# Patient Record
Sex: MALE | Race: WHITE
[De-identification: names, ages, dates, MRNs, and addresses within clinical notes are randomized per-mention and may not be internally consistent; named-entity substitution may affect disease eponyms.]

---

## 2019-12-31 ENCOUNTER — HOSPITAL ENCOUNTER (INPATIENT)
Dept: HOSPITAL 26 - MED | Age: 34
LOS: 5 days | Discharge: HOME | DRG: 720 | End: 2020-01-05
Attending: GENERAL PRACTICE | Admitting: GENERAL PRACTICE
Payer: MEDICAID

## 2019-12-31 VITALS — HEIGHT: 72 IN | WEIGHT: 315 LBS | BODY MASS INDEX: 42.66 KG/M2

## 2019-12-31 VITALS — DIASTOLIC BLOOD PRESSURE: 75 MMHG | SYSTOLIC BLOOD PRESSURE: 140 MMHG

## 2019-12-31 VITALS — DIASTOLIC BLOOD PRESSURE: 85 MMHG | SYSTOLIC BLOOD PRESSURE: 145 MMHG

## 2019-12-31 VITALS — DIASTOLIC BLOOD PRESSURE: 80 MMHG | SYSTOLIC BLOOD PRESSURE: 153 MMHG

## 2019-12-31 DIAGNOSIS — Z71.3: ICD-10-CM

## 2019-12-31 DIAGNOSIS — E87.1: ICD-10-CM

## 2019-12-31 DIAGNOSIS — E78.5: ICD-10-CM

## 2019-12-31 DIAGNOSIS — L03.115: ICD-10-CM

## 2019-12-31 DIAGNOSIS — A41.9: Primary | ICD-10-CM

## 2019-12-31 DIAGNOSIS — Z96.659: ICD-10-CM

## 2019-12-31 DIAGNOSIS — E66.01: ICD-10-CM

## 2019-12-31 LAB
ALBUMIN FLD-MCNC: 3.4 G/DL (ref 3.4–5)
ANION GAP SERPL CALCULATED.3IONS-SCNC: 15.2 MMOL/L (ref 8–16)
APPEARANCE UR: CLEAR
AST SERPL-CCNC: 15 U/L (ref 15–37)
BARBITURATES UR QL SCN: (no result) NG/ML
BASOPHILS # BLD AUTO: 0 K/UL (ref 0–0.22)
BASOPHILS NFR BLD AUTO: 0.2 % (ref 0–2)
BENZODIAZ UR QL SCN: (no result) NG/ML
BILIRUB SERPL-MCNC: 0.7 MG/DL (ref 0–1)
BILIRUB UR QL STRIP: NEGATIVE
BUN SERPL-MCNC: 7 MG/DL (ref 7–18)
BZE UR QL SCN: (no result) NG/ML
CANNABINOIDS UR QL SCN: (no result) NG/ML
CHLORIDE SERPL-SCNC: 98 MMOL/L (ref 98–107)
CO2 SERPL-SCNC: 26.5 MMOL/L (ref 21–32)
COLOR UR: YELLOW
CREAT SERPL-MCNC: 0.9 MG/DL (ref 0.7–1.3)
EOSINOPHIL # BLD AUTO: 0 K/UL (ref 0–0.4)
EOSINOPHIL NFR BLD AUTO: 0 % (ref 0–4)
ERYTHROCYTE [DISTWIDTH] IN BLOOD BY AUTOMATED COUNT: 14.3 % (ref 11.6–13.7)
GFR SERPL CREATININE-BSD FRML MDRD: 123 ML/MIN (ref 90–?)
GLUCOSE SERPL-MCNC: 108 MG/DL (ref 74–106)
GLUCOSE UR STRIP-MCNC: NEGATIVE MG/DL
HCT VFR BLD AUTO: 44.9 % (ref 36–52)
HGB BLD-MCNC: 14.7 G/DL (ref 12–18)
HGB UR QL STRIP: NEGATIVE
LEUKOCYTE ESTERASE UR QL STRIP: NEGATIVE
LYMPHOCYTES # BLD AUTO: 1.3 K/UL (ref 2–11.5)
LYMPHOCYTES NFR BLD AUTO: 7.6 % (ref 20.5–51.1)
MAGNESIUM SERPL-MCNC: 1.9 MG/DL (ref 1.8–2.4)
MCH RBC QN AUTO: 27 PG (ref 27–31)
MCHC RBC AUTO-ENTMCNC: 33 G/DL (ref 33–37)
MCV RBC AUTO: 83.9 FL (ref 80–94)
MONOCYTES # BLD AUTO: 0.6 K/UL (ref 0.8–1)
MONOCYTES NFR BLD AUTO: 3.9 % (ref 1.7–9.3)
NEUTROPHILS # BLD AUTO: 14.5 K/UL (ref 1.8–7.7)
NEUTROPHILS NFR BLD AUTO: 88.3 % (ref 42.2–75.2)
NITRITE UR QL STRIP: NEGATIVE
OPIATES UR QL SCN: (no result) NG/ML
PCP UR QL SCN: (no result) NG/ML
PH UR STRIP: 7 [PH] (ref 5–9)
PHOSPHATE SERPL-MCNC: 2.7 MG/DL (ref 2.5–4.9)
PLATELET # BLD AUTO: 215 K/UL (ref 140–450)
POTASSIUM SERPL-SCNC: 3.7 MMOL/L (ref 3.5–5.1)
PROTHROMBIN TIME: 10.8 SECS (ref 10.8–13.4)
RBC # BLD AUTO: 5.35 MIL/UL (ref 4.2–6.1)
SODIUM SERPL-SCNC: 136 MMOL/L (ref 136–145)
TSH SERPL DL<=0.05 MIU/L-ACNC: 0.96 UIU/ML (ref 0.34–3.74)
WBC # BLD AUTO: 16.4 K/UL (ref 4.8–10.8)

## 2019-12-31 RX ADMIN — ACETAMINOPHEN PRN MG: 325 TABLET ORAL at 23:38

## 2019-12-31 RX ADMIN — SODIUM CHLORIDE SCH MLS/HR: 9 INJECTION, SOLUTION INTRAVENOUS at 17:08

## 2019-12-31 RX ADMIN — DEXTROSE SCH MLS/HR: 50 INJECTION, SOLUTION INTRAVENOUS at 23:22

## 2019-12-31 RX ADMIN — SODIUM CHLORIDE SCH MLS/HR: 9 INJECTION, SOLUTION INTRAVENOUS at 18:55

## 2019-12-31 RX ADMIN — SODIUM CHLORIDE SCH MLS/HR: 9 INJECTION, SOLUTION INTRAVENOUS at 15:22

## 2019-12-31 NOTE — NUR
RECEIVED PATIENT FROM ER STAFF, PATIENT IN STABLE CONDITION, VITAL SIGNS ARE STABLE, WILL 
CONTINUE FLUIDS AND ABX AND START ADMISSION PAPERWORK

## 2019-12-31 NOTE — NUR
VITAL SIGNS TAKEN AT THIS TIME. PATIENT NOTED WITH TEMP .2. COOLING MEASURES RENDERED 
AND PRN ACETAMINOPHEN ADMINISTERED.

## 2019-12-31 NOTE — NUR
RECEIVED BEDSIDE REPORT FROM AM SHIFT NURSE. PATIENT SITTING ON BED WITH FAMILY MEMBER AT 
BEDSIDE. NO SOB OR DISTRESS NOTED. IV ACCESS ON LEFT AC, 18 GAUGE, PATENT AND INTACT. 
PATIENT NOTED WITH REDNESS ON LOWER RIGHT LEG, WARM TO TOUCH. INITIAL ASSESSMENT DONE. BOARD 
UPDATED. BED IN LOW, SAFETY MEASURES IN PLACE.  PATIENT IS AMBULATORY. PATIENT ON TELE 
MONITORING. CALL LIGHT PLACED WITHIN PATIENT REACH. WILL CONTINUE TO MONITOR PATIENT.

## 2019-12-31 NOTE — NUR
AAO X4 34 Y O M BIB FAMILY W/ C/O RLE PAIN/SWELLING/ERETHYMA STARTED YESTERDAY 
AT 1400. DENIES INJURY OR MED HX. PER PT HE WAS AT HOME

## 2019-12-31 NOTE — NUR
Patient will be admitted to care of DR GUTIERREZ. Admited to TELEMETRY.  Will go to 
room 112A. Belongings list completed.  Report to GIOVANNA DOVE.

## 2019-12-31 NOTE — NUR
PATIENT IN BED RESTING TALKING TO A FRIEND AT THE BEDSIDE. DENIES PAIN, NO RESP DISTRESS 
NOTED. WILL CONTINUE TO MONITOR.

## 2019-12-31 NOTE — NUR
VISUAL CHECK DONE. PATIENT IS EATING DINNER THAT HIS FAMILY BROUGHT IN. NO SOB OR DISTRESS 
NOTED. WILL CONTINUE TO MONITOR PATIENT.

## 2020-01-01 VITALS — SYSTOLIC BLOOD PRESSURE: 129 MMHG | DIASTOLIC BLOOD PRESSURE: 73 MMHG

## 2020-01-01 VITALS — SYSTOLIC BLOOD PRESSURE: 137 MMHG | DIASTOLIC BLOOD PRESSURE: 77 MMHG

## 2020-01-01 VITALS — SYSTOLIC BLOOD PRESSURE: 132 MMHG | DIASTOLIC BLOOD PRESSURE: 78 MMHG

## 2020-01-01 VITALS — SYSTOLIC BLOOD PRESSURE: 136 MMHG | DIASTOLIC BLOOD PRESSURE: 79 MMHG

## 2020-01-01 VITALS — SYSTOLIC BLOOD PRESSURE: 109 MMHG | DIASTOLIC BLOOD PRESSURE: 79 MMHG

## 2020-01-01 LAB
ANION GAP SERPL CALCULATED.3IONS-SCNC: 12.1 MMOL/L (ref 8–16)
BASOPHILS # BLD AUTO: 0 K/UL (ref 0–0.22)
BASOPHILS NFR BLD AUTO: 0.2 % (ref 0–2)
BUN SERPL-MCNC: 8 MG/DL (ref 7–18)
CHLORIDE SERPL-SCNC: 102 MMOL/L (ref 98–107)
CHOLEST/HDLC SERPL: 8.5 {RATIO} (ref 1–4.5)
CO2 SERPL-SCNC: 28.9 MMOL/L (ref 21–32)
CREAT SERPL-MCNC: 0.9 MG/DL (ref 0.7–1.3)
EOSINOPHIL # BLD AUTO: 0.1 K/UL (ref 0–0.4)
EOSINOPHIL NFR BLD AUTO: 0.7 % (ref 0–4)
ERYTHROCYTE [DISTWIDTH] IN BLOOD BY AUTOMATED COUNT: 14.2 % (ref 11.6–13.7)
GFR SERPL CREATININE-BSD FRML MDRD: 123 ML/MIN (ref 90–?)
GLUCOSE SERPL-MCNC: 94 MG/DL (ref 74–106)
HCT VFR BLD AUTO: 40 % (ref 36–52)
HDLC SERPL-MCNC: 23 MG/DL (ref 40–60)
HGB BLD-MCNC: 13.1 G/DL (ref 12–18)
LDLC SERPL CALC-MCNC: 143 MG/DL (ref 60–100)
LYMPHOCYTES # BLD AUTO: 1.3 K/UL (ref 2–11.5)
LYMPHOCYTES NFR BLD AUTO: 10.6 % (ref 20.5–51.1)
MAGNESIUM SERPL-MCNC: 2.1 MG/DL (ref 1.8–2.4)
MCH RBC QN AUTO: 28 PG (ref 27–31)
MCHC RBC AUTO-ENTMCNC: 33 G/DL (ref 33–37)
MCV RBC AUTO: 84.4 FL (ref 80–94)
MONOCYTES # BLD AUTO: 0.7 K/UL (ref 0.8–1)
MONOCYTES NFR BLD AUTO: 5.2 % (ref 1.7–9.3)
NEUTROPHILS # BLD AUTO: 10.5 K/UL (ref 1.8–7.7)
NEUTROPHILS NFR BLD AUTO: 83.3 % (ref 42.2–75.2)
PHOSPHATE SERPL-MCNC: 3 MG/DL (ref 2.5–4.9)
PLATELET # BLD AUTO: 178 K/UL (ref 140–450)
POTASSIUM SERPL-SCNC: 4 MMOL/L (ref 3.5–5.1)
RBC # BLD AUTO: 4.74 MIL/UL (ref 4.2–6.1)
SODIUM SERPL-SCNC: 139 MMOL/L (ref 136–145)
TRIGL SERPL-MCNC: 149 MG/DL (ref 30–150)
WBC # BLD AUTO: 12.6 K/UL (ref 4.8–10.8)

## 2020-01-01 RX ADMIN — SODIUM CHLORIDE SCH MLS/HR: 9 INJECTION, SOLUTION INTRAVENOUS at 10:19

## 2020-01-01 RX ADMIN — Medication SCH EACH: at 09:10

## 2020-01-01 RX ADMIN — PENICILLIN G POTASSIUM SCH MLS/HR: 5000000 POWDER, FOR SOLUTION INTRAMUSCULAR; INTRAPLEURAL; INTRATHECAL; INTRAVENOUS at 16:10

## 2020-01-01 RX ADMIN — MORPHINE SULFATE PRN MG: 2 INJECTION, SOLUTION INTRAMUSCULAR; INTRAVENOUS at 16:19

## 2020-01-01 RX ADMIN — DEXTROSE SCH MLS/HR: 50 INJECTION, SOLUTION INTRAVENOUS at 17:05

## 2020-01-01 RX ADMIN — DEXTROSE SCH MLS/HR: 50 INJECTION, SOLUTION INTRAVENOUS at 09:16

## 2020-01-01 RX ADMIN — ACETAMINOPHEN PRN MG: 325 TABLET ORAL at 20:37

## 2020-01-01 RX ADMIN — PENICILLIN G POTASSIUM SCH MLS/HR: 5000000 POWDER, FOR SOLUTION INTRAMUSCULAR; INTRAPLEURAL; INTRATHECAL; INTRAVENOUS at 19:39

## 2020-01-01 RX ADMIN — PENICILLIN G POTASSIUM SCH MLS/HR: 5000000 POWDER, FOR SOLUTION INTRAMUSCULAR; INTRAPLEURAL; INTRATHECAL; INTRAVENOUS at 12:42

## 2020-01-01 NOTE — NUR
RECEIVED BEDSIDE REPORT FROM NIGHT NURSE. PATIENT IS ASLEEP, EASILY AROUSABLE. RESPIRATIONS 
EVEN AND UNLABORED. IV INTACT AND PATENT TO LEFT AC WITH IVF NS 60ML/HR. SAFETY MEASURES IN 
PLACE. CALL LIGHT WITHIN REACH. 

-------------------------------------------------------------------------------

Addendum: 01/01/20 at 1042 by Tom Isaac RN

-------------------------------------------------------------------------------

ADDITION TO NOTE ABOVE: PER NIGHT SHIFT NURSE, PENICILLIN G IV ANTIBIOTIC UNAVAILABLE DURING 
THE NIGHT. PER NIGHT NURSE DR. MILADYS MURILLO.

## 2020-01-01 NOTE — NUR
RECEIVED REPORT FROM AM SHIFT NURSE. PATIENT IS LAYING ON BED AWAKE AND ALERT. NO DISTRESS 
NOTED. IV ACCESS ON LEFT AC 18 GAUGE, PATENT AND INTACT. PATIENT NOTED WITH REDNESS ON RIGHT 
LOWER LEG. SKIN IS INTACT. PATIENT IS AMBULATORY. INITIAL ASSESSMENT DONE. BOARD UPDATED. 
BED IN LOW, SAFETY PRECAUTIONS IN PLACE. CALL LIGHT PLACED WITHIN PATIENT REACH. WILL 
CONTINUE TO MONITOR PATIENT.

## 2020-01-01 NOTE — NUR
SPOKE WITH PHARMACY IN REGARDS TO PENICILLIN G BEING UNAVAILABLE DURING THE NIGHT AND THAT 
NO DOSES WERE GIVEN TO PATIENT. PHARMACY WILL VERIFY ORDERS AND WILL DELIVER MEDICATION TO 
UNIT AS SOON AS POSSIBLE.

## 2020-01-01 NOTE — NUR
PT HAS BEEN SCREENED AND CATEGORIZED AS MODERATE NUTRITION RISK. PT WILL BE SEEN WITHIN 3-5 
DAYS OF ADMISSION. 



1/3/20-1/5/20



LILIA LEE RD

## 2020-01-01 NOTE — NUR
PENICILLIN G POTASSIUM NOT AVAILABLE IN OMNICELL. SPOKE TO HOUSE SUPERVISOR MALIHA AND SAID 
ITS NOT AVAILABLE TO OVERRIDE THE MEDICATION AND TO ENDORSED TO AM SHIFT. SPOKE TO MD, DR. DAILY SAID TO GIVE IT IN THE MORNING AS SOON AS THE MEDICATION IS AVAILABLE FROM 
PHARMACY.

## 2020-01-01 NOTE — NUR
PATIENT IS ALERT AND ORIENTED X4. IV INTACT AND PATENT TO LEFT AC WITH IVF NS INFUSING 
@60ML/HR. NO S/S OF DISTRESS NOTED. NEEDS MET AT THIS TIME.

## 2020-01-01 NOTE — NUR
PATIENT TEMPERATURE RECHECKED WITH RESULT OF 98.6 ORAL. NO SOB OR DISTRESS NOTED. WILL 
CONTINUE TO MONITOR PATIENT.

## 2020-01-01 NOTE — NUR
AM MEDICATIONS GIVEN AS ORDERED. PATIENT ALERT, AWAKE AND VERBALLY RESPONSIVE. REPORTS 
TOLERABLE PAIN 2/10 TO RIGHT LOWER EXTREMITY. NO S/S OF DISTRESS NOTED. CALL LIGHT WITHIN 
REACH. SAFETY MEASURES IN PLACE.

## 2020-01-01 NOTE — NUR
TEMPERATURE RECHECKED WITH A RESULT .5. COOLING MEASURES STILL IN PLACE. NO SOB OR 
DISTRESS NOTED AT THIS TIME. CALL LIGHT PLACED WITHIN PATIENT REACH. WILL CONTINUE TO 
MONITOR PATIENT.

## 2020-01-01 NOTE — NUR
PATIENT IN STABLE CONDITION. CALL LIGHT WITHIN PATIENT REACH. ENDORSED TO AM SHIFT NURSE FOR 
CONTINUITY OF CARE.

## 2020-01-01 NOTE — NUR
ROUNDS MADE. PATIENT IS AWAKE, ALERT AND VERBALLY RESPONSIVE. DENIES PAIN AT THIS TIME. NO 
S/S OF DISTRESS NOTED. CALL LIGHT WITHIN REACH.

## 2020-01-01 NOTE — NUR
ROUNDS MADE. PATIENT AWAKE, ALERT AND VERBALLY RESPONSIVE. NO S/S OF DISTRESS NOTED. RIGHT 
LOWER EXTREMITY ELEVATED WITH PILLOW. REPORTS TOLERABLE PAIN 2/10. WILL CONTINUE TO MONITOR.

## 2020-01-02 VITALS — SYSTOLIC BLOOD PRESSURE: 155 MMHG | DIASTOLIC BLOOD PRESSURE: 86 MMHG

## 2020-01-02 VITALS — SYSTOLIC BLOOD PRESSURE: 133 MMHG | DIASTOLIC BLOOD PRESSURE: 85 MMHG

## 2020-01-02 VITALS — SYSTOLIC BLOOD PRESSURE: 148 MMHG | DIASTOLIC BLOOD PRESSURE: 86 MMHG

## 2020-01-02 VITALS — DIASTOLIC BLOOD PRESSURE: 79 MMHG | SYSTOLIC BLOOD PRESSURE: 133 MMHG

## 2020-01-02 VITALS — DIASTOLIC BLOOD PRESSURE: 82 MMHG | SYSTOLIC BLOOD PRESSURE: 137 MMHG

## 2020-01-02 VITALS — DIASTOLIC BLOOD PRESSURE: 86 MMHG | SYSTOLIC BLOOD PRESSURE: 140 MMHG

## 2020-01-02 LAB
ANION GAP SERPL CALCULATED.3IONS-SCNC: 12.6 MMOL/L (ref 8–16)
BASOPHILS # BLD AUTO: 0 K/UL (ref 0–0.22)
BASOPHILS NFR BLD AUTO: 0.3 % (ref 0–2)
BUN SERPL-MCNC: 8 MG/DL (ref 7–18)
CHLORIDE SERPL-SCNC: 103 MMOL/L (ref 98–107)
CO2 SERPL-SCNC: 28.8 MMOL/L (ref 21–32)
CREAT SERPL-MCNC: 0.7 MG/DL (ref 0.7–1.3)
EOSINOPHIL # BLD AUTO: 0.2 K/UL (ref 0–0.4)
EOSINOPHIL NFR BLD AUTO: 2.6 % (ref 0–4)
ERYTHROCYTE [DISTWIDTH] IN BLOOD BY AUTOMATED COUNT: 14.4 % (ref 11.6–13.7)
GFR SERPL CREATININE-BSD FRML MDRD: 164 ML/MIN (ref 90–?)
GLUCOSE SERPL-MCNC: 94 MG/DL (ref 74–106)
HCT VFR BLD AUTO: 39.6 % (ref 36–52)
HGB BLD-MCNC: 13.1 G/DL (ref 12–18)
LYMPHOCYTES # BLD AUTO: 1.4 K/UL (ref 2–11.5)
LYMPHOCYTES NFR BLD AUTO: 14.5 % (ref 20.5–51.1)
MAGNESIUM SERPL-MCNC: 2.4 MG/DL (ref 1.8–2.4)
MCH RBC QN AUTO: 28 PG (ref 27–31)
MCHC RBC AUTO-ENTMCNC: 33 G/DL (ref 33–37)
MCV RBC AUTO: 84.3 FL (ref 80–94)
MONOCYTES # BLD AUTO: 0.8 K/UL (ref 0.8–1)
MONOCYTES NFR BLD AUTO: 8.7 % (ref 1.7–9.3)
NEUTROPHILS # BLD AUTO: 7 K/UL (ref 1.8–7.7)
NEUTROPHILS NFR BLD AUTO: 73.9 % (ref 42.2–75.2)
PHOSPHATE SERPL-MCNC: 3.4 MG/DL (ref 2.5–4.9)
PLATELET # BLD AUTO: 180 K/UL (ref 140–450)
POTASSIUM SERPL-SCNC: 4.4 MMOL/L (ref 3.5–5.1)
RBC # BLD AUTO: 4.7 MIL/UL (ref 4.2–6.1)
SODIUM SERPL-SCNC: 140 MMOL/L (ref 136–145)
WBC # BLD AUTO: 9.5 K/UL (ref 4.8–10.8)

## 2020-01-02 RX ADMIN — SODIUM CHLORIDE SCH MLS/HR: 9 INJECTION, SOLUTION INTRAVENOUS at 19:08

## 2020-01-02 RX ADMIN — ATORVASTATIN CALCIUM SCH MG: 20 TABLET, FILM COATED ORAL at 20:31

## 2020-01-02 RX ADMIN — MORPHINE SULFATE PRN MG: 2 INJECTION, SOLUTION INTRAMUSCULAR; INTRAVENOUS at 20:56

## 2020-01-02 RX ADMIN — DEXTROSE SCH MLS/HR: 50 INJECTION, SOLUTION INTRAVENOUS at 17:18

## 2020-01-02 RX ADMIN — PENICILLIN G POTASSIUM SCH MLS/HR: 5000000 POWDER, FOR SOLUTION INTRAMUSCULAR; INTRAPLEURAL; INTRATHECAL; INTRAVENOUS at 00:34

## 2020-01-02 RX ADMIN — DEXTROSE SCH MLS/HR: 50 INJECTION, SOLUTION INTRAVENOUS at 08:41

## 2020-01-02 RX ADMIN — PENICILLIN G POTASSIUM SCH MLS/HR: 5000000 POWDER, FOR SOLUTION INTRAMUSCULAR; INTRAPLEURAL; INTRATHECAL; INTRAVENOUS at 04:10

## 2020-01-02 RX ADMIN — PENICILLIN G POTASSIUM SCH MLS/HR: 5000000 POWDER, FOR SOLUTION INTRAMUSCULAR; INTRAPLEURAL; INTRATHECAL; INTRAVENOUS at 16:21

## 2020-01-02 RX ADMIN — PENICILLIN G POTASSIUM SCH MLS/HR: 5000000 POWDER, FOR SOLUTION INTRAMUSCULAR; INTRAPLEURAL; INTRATHECAL; INTRAVENOUS at 11:24

## 2020-01-02 RX ADMIN — ACETAMINOPHEN PRN MG: 325 TABLET ORAL at 20:54

## 2020-01-02 RX ADMIN — Medication SCH EACH: at 08:40

## 2020-01-02 RX ADMIN — PENICILLIN G POTASSIUM SCH MLS/HR: 5000000 POWDER, FOR SOLUTION INTRAMUSCULAR; INTRAPLEURAL; INTRATHECAL; INTRAVENOUS at 12:44

## 2020-01-02 RX ADMIN — PENICILLIN G POTASSIUM SCH MLS/HR: 5000000 POWDER, FOR SOLUTION INTRAMUSCULAR; INTRAPLEURAL; INTRATHECAL; INTRAVENOUS at 20:31

## 2020-01-02 RX ADMIN — DEXTROSE SCH MLS/HR: 50 INJECTION, SOLUTION INTRAVENOUS at 23:15

## 2020-01-02 RX ADMIN — DEXTROSE SCH MLS/HR: 50 INJECTION, SOLUTION INTRAVENOUS at 01:14

## 2020-01-02 RX ADMIN — SODIUM CHLORIDE SCH MLS/HR: 9 INJECTION, SOLUTION INTRAVENOUS at 02:28

## 2020-01-02 NOTE — NUR
RECEIVED REPORT FROM AM SHIFT RN FOR PT'S CONTINUITY OF CARE. PT IS AAOX4, FAMILY MEMBER AT 
BEDSIDE, ON CARDIAC MONITOR, ON ROOM AIR, C/O PAIN 8/10. EXPLAINED TO PT THE NIGHT SHIFT 
ROUTINE, PT VERBALIZED UNDERSTANDING. SAFETY MEASURES IN PLACE, AND CALL LIGHT IS WITHIN 
REACH. WILL MONITOR PT THROUGHOUT SHIFT.

## 2020-01-02 NOTE — NUR
PO MEDS GIVEN AS SCHEDULED. IV SITE WAS LEAKING. UNABLE TO GIVE IV MEDS. WILL START NEW IV 
ON RIGHT ARM. PATIENT APPEARS STABLE, NO SIGNS OF DISTRESS.

## 2020-01-02 NOTE — NUR
REPORT GIVEN TO NIGHT SHIFT NURSE AT BEDSIDE FOR CONTINUITY OF CARE. PT APPEARS STABLE. CALL 
LIGHT WITHIN PT'S REACH.

## 2020-01-02 NOTE — NUR
PT IS SITTING IN BED. PT APPEARS STABLE. NO SIGNS OF DISTRESS. SCHEDULED MEDS ADMINISTERED. 
WILL CONTINUE MONITORING

## 2020-01-02 NOTE — NUR
ADMINISTERED SCHEDULED PO, IV ABX, AND SUBQ MEDICATIONS AS ORDERED. PT C/O RIGHT LEG PAIN 
AND DISCOMFORT. WILL MEDICATE PT. PT HAS SLIGHTLY ELEVATED TEMP. WILL MEDICATE PT WITH PAIN 
MED AND ANTI-PYRETICS MED.

## 2020-01-02 NOTE — NUR
VITALS TAKEN. NO DISTRESS NOTED. CALL LIGHT WITHIN PATIENT REACH. WILL CONTINUE TO MONITOR 
PATIENT.

## 2020-01-02 NOTE — NUR
PATIENT IN STABLE CONDITION. CALL LIGHT PLACED WITHIN PATIENT REACH. WILL ENDORSE TO AM 
SHIFT NURSE FOR CONTINUITY OF CARE.

## 2020-01-02 NOTE — NUR
ADMINISTERED SCHEDULED IV ABX AS ORDERED. PT DENIES ANY PAIN AT THIS TIME. WILL CONTINUE TO 
MONITOR PT.

## 2020-01-02 NOTE — NUR
FREQUENT ROUNDING DONE. PT IS AWAKE, SITTING IN BED. NO SIGNS OF DISTRESS. WILL CONTINUE 
MONITORING

## 2020-01-02 NOTE — NUR
NOTIFIED MD THAT PATIENT HAS PVCs ON EKG. PATIENT IS ASYMPTOMATIC. NO DISTRESS NOTED. WILL 
CONTINUE TO MONITOR PATIENT.

## 2020-01-02 NOTE — NUR
received report from night shift nurse. pt is awake and appears stable. iv infusing well and 
iv site patent. will continue monitoring

## 2020-01-03 VITALS — DIASTOLIC BLOOD PRESSURE: 99 MMHG | SYSTOLIC BLOOD PRESSURE: 138 MMHG

## 2020-01-03 VITALS — SYSTOLIC BLOOD PRESSURE: 154 MMHG | DIASTOLIC BLOOD PRESSURE: 80 MMHG

## 2020-01-03 VITALS — DIASTOLIC BLOOD PRESSURE: 90 MMHG | SYSTOLIC BLOOD PRESSURE: 152 MMHG

## 2020-01-03 VITALS — SYSTOLIC BLOOD PRESSURE: 133 MMHG | DIASTOLIC BLOOD PRESSURE: 81 MMHG

## 2020-01-03 LAB
ANION GAP SERPL CALCULATED.3IONS-SCNC: 14.5 MMOL/L (ref 8–16)
BASOPHILS # BLD AUTO: 0 K/UL (ref 0–0.22)
BASOPHILS NFR BLD AUTO: 0.2 % (ref 0–2)
BUN SERPL-MCNC: 11 MG/DL (ref 7–18)
CHLORIDE SERPL-SCNC: 98 MMOL/L (ref 98–107)
CO2 SERPL-SCNC: 24.4 MMOL/L (ref 21–32)
CREAT SERPL-MCNC: 0.7 MG/DL (ref 0.7–1.3)
EOSINOPHIL # BLD AUTO: 0.1 K/UL (ref 0–0.4)
EOSINOPHIL NFR BLD AUTO: 0.9 % (ref 0–4)
ERYTHROCYTE [DISTWIDTH] IN BLOOD BY AUTOMATED COUNT: 14.3 % (ref 11.6–13.7)
GFR SERPL CREATININE-BSD FRML MDRD: 164 ML/MIN (ref 90–?)
GLUCOSE SERPL-MCNC: 96 MG/DL (ref 74–106)
HCT VFR BLD AUTO: 39.8 % (ref 36–52)
HGB BLD-MCNC: 13.2 G/DL (ref 12–18)
LYMPHOCYTES # BLD AUTO: 0.8 K/UL (ref 2–11.5)
LYMPHOCYTES NFR BLD AUTO: 9.7 % (ref 20.5–51.1)
MAGNESIUM SERPL-MCNC: 2 MG/DL (ref 1.8–2.4)
MCH RBC QN AUTO: 28 PG (ref 27–31)
MCHC RBC AUTO-ENTMCNC: 33 G/DL (ref 33–37)
MCV RBC AUTO: 83.1 FL (ref 80–94)
MONOCYTES # BLD AUTO: 0.4 K/UL (ref 0.8–1)
MONOCYTES NFR BLD AUTO: 5.7 % (ref 1.7–9.3)
NEUTROPHILS # BLD AUTO: 6.6 K/UL (ref 1.8–7.7)
NEUTROPHILS NFR BLD AUTO: 83.5 % (ref 42.2–75.2)
PHOSPHATE SERPL-MCNC: 3.4 MG/DL (ref 2.5–4.9)
PLATELET # BLD AUTO: 194 K/UL (ref 140–450)
POTASSIUM SERPL-SCNC: 3.9 MMOL/L (ref 3.5–5.1)
RBC # BLD AUTO: 4.79 MIL/UL (ref 4.2–6.1)
SODIUM SERPL-SCNC: 133 MMOL/L (ref 136–145)
T4 SERPL-MCNC: 7.4 UG/DL (ref 4.5–12)
WBC # BLD AUTO: 7.9 K/UL (ref 4.8–10.8)

## 2020-01-03 RX ADMIN — DEXTROSE SCH MLS/HR: 50 INJECTION, SOLUTION INTRAVENOUS at 17:33

## 2020-01-03 RX ADMIN — PENICILLIN G POTASSIUM SCH MLS/HR: 5000000 POWDER, FOR SOLUTION INTRAMUSCULAR; INTRAPLEURAL; INTRATHECAL; INTRAVENOUS at 00:03

## 2020-01-03 RX ADMIN — PENICILLIN G POTASSIUM SCH MLS/HR: 5000000 POWDER, FOR SOLUTION INTRAMUSCULAR; INTRAPLEURAL; INTRATHECAL; INTRAVENOUS at 20:29

## 2020-01-03 RX ADMIN — DEXTROSE SCH MLS/HR: 50 INJECTION, SOLUTION INTRAVENOUS at 09:39

## 2020-01-03 RX ADMIN — PENICILLIN G POTASSIUM SCH MLS/HR: 5000000 POWDER, FOR SOLUTION INTRAMUSCULAR; INTRAPLEURAL; INTRATHECAL; INTRAVENOUS at 23:05

## 2020-01-03 RX ADMIN — Medication SCH EACH: at 09:38

## 2020-01-03 RX ADMIN — SODIUM CHLORIDE SCH MLS/HR: 9 INJECTION, SOLUTION INTRAVENOUS at 11:48

## 2020-01-03 RX ADMIN — ATORVASTATIN CALCIUM SCH MG: 20 TABLET, FILM COATED ORAL at 20:29

## 2020-01-03 RX ADMIN — PENICILLIN G POTASSIUM SCH MLS/HR: 5000000 POWDER, FOR SOLUTION INTRAMUSCULAR; INTRAPLEURAL; INTRATHECAL; INTRAVENOUS at 16:09

## 2020-01-03 RX ADMIN — PENICILLIN G POTASSIUM SCH MLS/HR: 5000000 POWDER, FOR SOLUTION INTRAMUSCULAR; INTRAPLEURAL; INTRATHECAL; INTRAVENOUS at 03:34

## 2020-01-03 RX ADMIN — PENICILLIN G POTASSIUM SCH MLS/HR: 5000000 POWDER, FOR SOLUTION INTRAMUSCULAR; INTRAPLEURAL; INTRATHECAL; INTRAVENOUS at 12:04

## 2020-01-03 RX ADMIN — SODIUM CHLORIDE SCH MLS/HR: 9 INJECTION, SOLUTION INTRAVENOUS at 16:11

## 2020-01-03 RX ADMIN — PENICILLIN G POTASSIUM SCH MLS/HR: 5000000 POWDER, FOR SOLUTION INTRAMUSCULAR; INTRAPLEURAL; INTRATHECAL; INTRAVENOUS at 10:18

## 2020-01-03 NOTE — NUR
BITA Assessment/Discharge Plan



High Risk DC Screen 

No

 Name: 

Goldie Scales

Home Tel: 

[phone]

Relationship: 

mother

Pre-Admission Living Arrangements: 

Lives with Other

Other: 

roommate

Prior ADL 

 Independent

Current Home Health Name/Tel: 

N/A

Current DME/02 Name/Tel: 

N/A

Current Hospice Name/Tel: 

N/A

Current Dialysis Name/Tel: 

N/A

Healthcare Decision Maker: 

Patient

Advance Directive 

No

Information Taught: 

 Advance Directive

Community Resources

Person Taught: 

Patient

Teaching Tools: 

Community Resources

Computer Generated Print

Verbal

Factors Affecting Learning: 

None

Participation Level: 

 Active

Evaluation: 

Gestures Understanding

Verbalizes Understanding

Educator: 

Elizabeth Reyes, MSW

Discipline: 

Case Mgt/Social Svcs

Tentative Discharge Plan Summary: 

Patient is a 34 year old male admitted for sepsis and cellulitis. I met 

with patient at bedside. Patient alert and oriented x4. Patient lives at 

home with his roommate and plans to return home upo discharge. Patient 

does not have a pcp at this time. I provided patient with education on 

[facility], www.[facility].org for community resources including a list of 

low cost clinics. Patient denied alcohol/substance abuse. He also denied 

hx of mental health. He does not have any questions/concerns at this time. 

I provided him with my contact information.  and/or Case 

Manager will follow up as needed. 

 Signature: 

Elizabeth Reyes, MSW

Date: 

Jan 2, 2020

## 2020-01-03 NOTE — NUR
PT HAVING EPISODE OF EMESIS, APPROX 20ML OF SALIVA. STATES FEELING OF N/V. ADMINISTERED PRN 
IVP ZOFRAN. PT'S NEEDS MET AT THIS TIME. WILL CONTINUE TO MONITOR PT.

## 2020-01-03 NOTE — NUR
PATIENT SITTING IN BED WATCHING TV. NO DISTRESS NOTED. PAIN WITHIN TOLERABLE. SCHEDULED 
MEDICATIONS DUE GIVEN. WILL CONTINUE TO MONITOR.

## 2020-01-03 NOTE — NUR
RECEIVED REPORT FROM DAY SHIFT NURSE. PATIENT LYING DOWN IN BED. NO DISTRESS NOTED. AAOX4, 
CALM, COOPERATIVE, SKIN WARM AND DRY TO TOUCH. HAS RLE CLOSED CELLULITIS THAT IS RED, AND 
FEELS HOT. IV SITE INTACT, PATENT, AND INFUSING IVF AS PER MD ORDERS. REVIEWED PLAN OF CARE 
WITH PATIENT. PATIENT VERBALIZED UNDERSTANDING. SAFETY MEASURES IN PLACE, CALL LIGHT WITHIN 
REACH. WILL CONTINUE TO MONITOR.

## 2020-01-03 NOTE — NUR
PATIENT SITTING IN BED ON HIS PHONE. NO DISTRESS NOTED. PAIN WITHIN TOLERABLE. WILL CONTINUE 
TO MONITOR.

## 2020-01-03 NOTE — NUR
RECEIVED REPORT FROM NIGHT SHIFT NURSE. PATIENT SITTING DOWN IN BED ON HIS PHONE. NO 
DISTRESS NOTED. PAIN WITHIN TOLERABLE AT THIS TIME. AAOX4, CALM, COOPERATIVE, SKIN COLOR 
APPROPRIATE TO ETHNICITY, WARM TO TOUCH. HAS RLE CLOSED CELLULITIS THAT IS RED, AND FEELS 
HOT. IV SITE INTACT, PATENT, AND INFUSING IVF AS PER MD ORDERS. REVIEWED PLAN OF CARE WITH 
PATIENT. PATIENT VERBALIZED UNDERSTANDING. SAFETY MEASURES IN PLACE, CALL LIGHT WITHIN 
REACH. WILL CONTINUE TO MONITOR.

## 2020-01-03 NOTE — NUR
PATIENT AMBULATED TO BATHROOM AND BACK TO BED INDEPENDENTLY.  SCHEDULED MEDICATIONS DUE 
GIVEN. WILL CONTINUE TO MONITOR.

## 2020-01-03 NOTE — NUR
PT LYING DOWN APPEARS TO BE ASLEEP, WITH NO SIGNS OF DISTRESS. WILL ENDORSE TO AM SHIFT RN 
FOR PT'S CONTINUITY OF CARE.

## 2020-01-03 NOTE — NUR
ASSISTED PT TO THE RESTROOM, PT STATES HE HAD DIARRHEA. PT TEACHING GIVEN RE: ABX TX, AND 
OTHER MEDICATIONS POSSIBLY CAUSING DIARRHEA. REMINDED PT TO INFORM RN WHEN DIARRHEA MORE 
THAN 2X. WILL INFORM MD AS WELL.

## 2020-01-03 NOTE — NUR
ADMINISTERED SCHEDULED IV ABX AS ORDERED. PT TRANSFERRED , PT TOLERATED IT WELL. 
ASSISTED PT TO THE RESTROOM. PT DENIES ANY PAIN AT THIS TIME.

## 2020-01-03 NOTE — NUR
PATIENT AMBULATED TO BATHROOM AND BACK TO BED WITH STABLE GAIT. CONDITION UNCHANGED. WILL 
CONTINUE TO MONITOR.

## 2020-01-03 NOTE — NUR
DC PLANNING

34 YRS OLD MALE PATIENT WAS ADMITTED FROM HOME WITH A DX OF SEPSIS, CELLULITIS. PT HAS NO 
MEDICAL HX . WBC 16.2 ADMINISTERED IVF AND VANCOMYCIN . VENOUS US SHOWED NO DVT SUBCUTANEOUS 
EDEMA . ARASELI QURESHI CONSULTED ORDERED CLINDAMYCIN AND PENICILLIN AND ELEVATED THE LEG AT 45 
DEGREE. 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 
DISEASE. STILL CONCERN FOR UNDERLYING ABSCESS DR REYNA CONSULTED . DC PLAN PER SURGEON 
RECOMMENDATION, CM TO FOLLOW. 

-------------------------------------------------------------------------------

Addendum: 01/04/20 at 1351 by Angélica Enriquez 

-------------------------------------------------------------------------------

DC PLANNING:

SEEN BY DR SORIANO , NO NEED FOR DEBRIDEMENT, CONTINUE IV ABX .CARDIOLOGIST CLEAR PT , ECHO WAS 
NORMAL.  PT C/O PAIN WHEN PUTTING WEIGHT ON THE AFFECTED LEG AND STILL SWOLLEN AND REDDENED. 
CM TO FOLLOW

## 2020-01-04 VITALS — SYSTOLIC BLOOD PRESSURE: 136 MMHG | DIASTOLIC BLOOD PRESSURE: 87 MMHG

## 2020-01-04 VITALS — SYSTOLIC BLOOD PRESSURE: 134 MMHG | DIASTOLIC BLOOD PRESSURE: 85 MMHG

## 2020-01-04 VITALS — SYSTOLIC BLOOD PRESSURE: 133 MMHG | DIASTOLIC BLOOD PRESSURE: 77 MMHG

## 2020-01-04 LAB
ANION GAP SERPL CALCULATED.3IONS-SCNC: 12.3 MMOL/L (ref 8–16)
BASOPHILS # BLD AUTO: 0 K/UL (ref 0–0.22)
BASOPHILS NFR BLD AUTO: 0.5 % (ref 0–2)
BUN SERPL-MCNC: 10 MG/DL (ref 7–18)
CHLORIDE SERPL-SCNC: 102 MMOL/L (ref 98–107)
CO2 SERPL-SCNC: 28.7 MMOL/L (ref 21–32)
CREAT SERPL-MCNC: 0.6 MG/DL (ref 0.7–1.3)
EOSINOPHIL # BLD AUTO: 0.3 K/UL (ref 0–0.4)
EOSINOPHIL NFR BLD AUTO: 4.4 % (ref 0–4)
ERYTHROCYTE [DISTWIDTH] IN BLOOD BY AUTOMATED COUNT: 14.4 % (ref 11.6–13.7)
GFR SERPL CREATININE-BSD FRML MDRD: 196 ML/MIN (ref 90–?)
GLUCOSE SERPL-MCNC: 93 MG/DL (ref 74–106)
HCT VFR BLD AUTO: 39.7 % (ref 36–52)
HGB BLD-MCNC: 13.1 G/DL (ref 12–18)
LYMPHOCYTES # BLD AUTO: 1.1 K/UL (ref 2–11.5)
LYMPHOCYTES NFR BLD AUTO: 17 % (ref 20.5–51.1)
MAGNESIUM SERPL-MCNC: 2.3 MG/DL (ref 1.8–2.4)
MCH RBC QN AUTO: 28 PG (ref 27–31)
MCHC RBC AUTO-ENTMCNC: 33 G/DL (ref 33–37)
MCV RBC AUTO: 83.6 FL (ref 80–94)
MONOCYTES # BLD AUTO: 0.8 K/UL (ref 0.8–1)
MONOCYTES NFR BLD AUTO: 12.3 % (ref 1.7–9.3)
NEUTROPHILS # BLD AUTO: 4.3 K/UL (ref 1.8–7.7)
NEUTROPHILS NFR BLD AUTO: 65.8 % (ref 42.2–75.2)
PHOSPHATE SERPL-MCNC: 3.3 MG/DL (ref 2.5–4.9)
PLATELET # BLD AUTO: 201 K/UL (ref 140–450)
POTASSIUM SERPL-SCNC: 4 MMOL/L (ref 3.5–5.1)
RBC # BLD AUTO: 4.75 MIL/UL (ref 4.2–6.1)
SODIUM SERPL-SCNC: 139 MMOL/L (ref 136–145)
WBC # BLD AUTO: 6.6 K/UL (ref 4.8–10.8)

## 2020-01-04 RX ADMIN — DEXTROSE SCH MLS/HR: 50 INJECTION, SOLUTION INTRAVENOUS at 00:16

## 2020-01-04 RX ADMIN — SODIUM CHLORIDE SCH MLS/HR: 9 INJECTION, SOLUTION INTRAVENOUS at 07:00

## 2020-01-04 RX ADMIN — PENICILLIN G POTASSIUM SCH MLS/HR: 5000000 POWDER, FOR SOLUTION INTRAMUSCULAR; INTRAPLEURAL; INTRATHECAL; INTRAVENOUS at 03:12

## 2020-01-04 RX ADMIN — DEXTROSE SCH MLS/HR: 50 INJECTION, SOLUTION INTRAVENOUS at 17:38

## 2020-01-04 RX ADMIN — Medication SCH EACH: at 08:33

## 2020-01-04 RX ADMIN — PENICILLIN G POTASSIUM SCH MLS/HR: 5000000 POWDER, FOR SOLUTION INTRAMUSCULAR; INTRAPLEURAL; INTRATHECAL; INTRAVENOUS at 20:39

## 2020-01-04 RX ADMIN — PENICILLIN G POTASSIUM SCH MLS/HR: 5000000 POWDER, FOR SOLUTION INTRAMUSCULAR; INTRAPLEURAL; INTRATHECAL; INTRAVENOUS at 16:39

## 2020-01-04 RX ADMIN — DEXTROSE SCH MLS/HR: 50 INJECTION, SOLUTION INTRAVENOUS at 08:58

## 2020-01-04 RX ADMIN — PENICILLIN G POTASSIUM SCH MLS/HR: 5000000 POWDER, FOR SOLUTION INTRAMUSCULAR; INTRAPLEURAL; INTRATHECAL; INTRAVENOUS at 12:18

## 2020-01-04 RX ADMIN — ATORVASTATIN CALCIUM SCH MG: 20 TABLET, FILM COATED ORAL at 21:38

## 2020-01-04 RX ADMIN — PENICILLIN G POTASSIUM SCH MLS/HR: 5000000 POWDER, FOR SOLUTION INTRAMUSCULAR; INTRAPLEURAL; INTRATHECAL; INTRAVENOUS at 08:33

## 2020-01-04 RX ADMIN — SODIUM CHLORIDE SCH MLS/HR: 9 INJECTION, SOLUTION INTRAVENOUS at 21:08

## 2020-01-04 NOTE — NUR
Pt sitting up at edge of bed, eating lunch. No c/o discomfort. No signs of distress. Call 
light within reach.

## 2020-01-04 NOTE — NUR
1/4/2020 RD INITIAL ASSESSMENT COMPLETED

PLEASE REFER TO NUTRITION ASSESSMENT UNDER CARE ACTIVITY FOR ESTIMATED NUTRITIONAL NEEDS. 



RD RECOMMENDATIONS:

1. CONTINUE REGULAR DIET AS TOLERATED. 

2. ENCOURAGE PO INTAKE FOR ADEQUATE NUTRITION INTAKE.  

3. RD WILL F/U 2-3 DAYS; HIGH RISK. 



CLYDE GREEN, RD

## 2020-01-04 NOTE — NUR
Received report from pm nurse Sada. Pt resting in bed, awake, no signs of distress, no c/o 
discomfort. Right forearm IV intact with ongoing NS @ 60ml/h. Call light within reach.

## 2020-01-04 NOTE — NUR
ASSUMED CARE. RECEIVED ALERT,ORIENTED. AFEBRILE, NOT IN ACUTE DISTRESS. DENIES ANY PAIN OR 
DISCOMFORT. WITH IV FLUID NS INFUSING AT 60 ML/HR VIA RIGHT FOREARM #22 IV LINE. SAO2=95% ON 
ROOM AIR. VS STABLE, WILL CONTINUE TO MONITOR. NEEDS ATTENDED.

## 2020-01-04 NOTE — NUR
Pt amb back from restroom to bed with steady gait. No c/o discomfort, no signs of distress. 
Able to maneuver IV pole during amb with good safety awareness.

## 2020-01-05 VITALS — DIASTOLIC BLOOD PRESSURE: 91 MMHG | SYSTOLIC BLOOD PRESSURE: 138 MMHG

## 2020-01-05 VITALS — DIASTOLIC BLOOD PRESSURE: 80 MMHG | SYSTOLIC BLOOD PRESSURE: 130 MMHG

## 2020-01-05 LAB
ANION GAP SERPL CALCULATED.3IONS-SCNC: 15 MMOL/L (ref 8–16)
BASOPHILS # BLD AUTO: 0 K/UL (ref 0–0.22)
BASOPHILS NFR BLD AUTO: 0.5 % (ref 0–2)
BUN SERPL-MCNC: 8 MG/DL (ref 7–18)
CHLORIDE SERPL-SCNC: 103 MMOL/L (ref 98–107)
CO2 SERPL-SCNC: 25.9 MMOL/L (ref 21–32)
CREAT SERPL-MCNC: 0.6 MG/DL (ref 0.7–1.3)
EOSINOPHIL # BLD AUTO: 0.4 K/UL (ref 0–0.4)
EOSINOPHIL NFR BLD AUTO: 6.3 % (ref 0–4)
ERYTHROCYTE [DISTWIDTH] IN BLOOD BY AUTOMATED COUNT: 14.2 % (ref 11.6–13.7)
GFR SERPL CREATININE-BSD FRML MDRD: 196 ML/MIN (ref 90–?)
GLUCOSE SERPL-MCNC: 91 MG/DL (ref 74–106)
HCT VFR BLD AUTO: 38.9 % (ref 36–52)
HGB BLD-MCNC: 12.8 G/DL (ref 12–18)
LYMPHOCYTES # BLD AUTO: 1.2 K/UL (ref 2–11.5)
LYMPHOCYTES NFR BLD AUTO: 17.8 % (ref 20.5–51.1)
MAGNESIUM SERPL-MCNC: 2.2 MG/DL (ref 1.8–2.4)
MCH RBC QN AUTO: 28 PG (ref 27–31)
MCHC RBC AUTO-ENTMCNC: 33 G/DL (ref 33–37)
MCV RBC AUTO: 83.6 FL (ref 80–94)
MONOCYTES # BLD AUTO: 0.6 K/UL (ref 0.8–1)
MONOCYTES NFR BLD AUTO: 8.5 % (ref 1.7–9.3)
NEUTROPHILS # BLD AUTO: 4.4 K/UL (ref 1.8–7.7)
NEUTROPHILS NFR BLD AUTO: 66.9 % (ref 42.2–75.2)
PHOSPHATE SERPL-MCNC: 4.2 MG/DL (ref 2.5–4.9)
PLATELET # BLD AUTO: 225 K/UL (ref 140–450)
POTASSIUM SERPL-SCNC: 3.9 MMOL/L (ref 3.5–5.1)
RBC # BLD AUTO: 4.66 MIL/UL (ref 4.2–6.1)
SODIUM SERPL-SCNC: 140 MMOL/L (ref 136–145)
WBC # BLD AUTO: 6.6 K/UL (ref 4.8–10.8)

## 2020-01-05 RX ADMIN — PENICILLIN G POTASSIUM SCH MLS/HR: 5000000 POWDER, FOR SOLUTION INTRAMUSCULAR; INTRAPLEURAL; INTRATHECAL; INTRAVENOUS at 04:00

## 2020-01-05 RX ADMIN — PENICILLIN G POTASSIUM SCH MLS/HR: 5000000 POWDER, FOR SOLUTION INTRAMUSCULAR; INTRAPLEURAL; INTRATHECAL; INTRAVENOUS at 00:09

## 2020-01-05 RX ADMIN — PENICILLIN G POTASSIUM SCH MLS/HR: 5000000 POWDER, FOR SOLUTION INTRAMUSCULAR; INTRAPLEURAL; INTRATHECAL; INTRAVENOUS at 09:12

## 2020-01-05 RX ADMIN — DEXTROSE SCH MLS/HR: 50 INJECTION, SOLUTION INTRAVENOUS at 08:35

## 2020-01-05 RX ADMIN — PENICILLIN G POTASSIUM SCH MLS/HR: 5000000 POWDER, FOR SOLUTION INTRAMUSCULAR; INTRAPLEURAL; INTRATHECAL; INTRAVENOUS at 09:08

## 2020-01-05 RX ADMIN — Medication SCH EACH: at 08:36

## 2020-01-05 RX ADMIN — DEXTROSE SCH MLS/HR: 50 INJECTION, SOLUTION INTRAVENOUS at 00:39

## 2020-01-05 NOTE — NUR
Written and verbal discharge instructions provided to pt, verbalized understanding. Agree to 
f/u at [facility] in 1 week and to continue taking po meds as 
ordered. Right AC IV discontinued, cannula intact, site with scant bleeding covered with dry 
gauze & tape. Pt left unit via wheelchair, with family waiting with private car at [facility]. 
All belongings with pt upon departure.

## 2020-01-05 NOTE — NUR
ASLEEP,NOT IN ANY KIND OF DISTRESS. DUE IV ANTIBIOTIC GIVEN AS SCHEDULED. PT. REMAINS STABLE 
AND PAIN FREE.

## 2020-01-05 NOTE — NUR
Received report from pm nurse Adam. Pt resting in bed, awake, no signs of distress, no c/o 
discomfort. Right AC IV intact with ongoing NS @ 60ml/h. Call light within reach.

## 2020-01-05 NOTE — NUR
ASLEEP, NOT IN ACUTE DISTRESS. NO PAIN OR DISCOMFORT NOTED. SIDE RAILS UP, CALL LIGHT WITHIN 
REACH. KEPT WARM AND COMFORTABLE. VS REMAIN STABLE. WILL CONTINUE TO MONITOR.

## 2020-03-07 ENCOUNTER — HOSPITAL ENCOUNTER (EMERGENCY)
Dept: HOSPITAL 26 - MED | Age: 35
Discharge: HOME | End: 2020-03-07
Payer: MEDICAID

## 2020-03-07 VITALS — DIASTOLIC BLOOD PRESSURE: 74 MMHG | SYSTOLIC BLOOD PRESSURE: 134 MMHG

## 2020-03-07 VITALS — DIASTOLIC BLOOD PRESSURE: 79 MMHG | SYSTOLIC BLOOD PRESSURE: 135 MMHG

## 2020-03-07 VITALS — WEIGHT: 315 LBS | BODY MASS INDEX: 44.59 KG/M2 | HEIGHT: 70.5 IN

## 2020-03-07 DIAGNOSIS — I10: ICD-10-CM

## 2020-03-07 DIAGNOSIS — Z79.899: ICD-10-CM

## 2020-03-07 DIAGNOSIS — R25.2: ICD-10-CM

## 2020-03-07 DIAGNOSIS — R20.2: Primary | ICD-10-CM

## 2020-03-07 LAB
ANION GAP SERPL CALCULATED.3IONS-SCNC: 11.7 MMOL/L (ref 8–16)
BASOPHILS # BLD AUTO: 0.1 K/UL (ref 0–0.22)
BASOPHILS NFR BLD AUTO: 0.6 % (ref 0–2)
BUN SERPL-MCNC: 8 MG/DL (ref 7–18)
CHLORIDE SERPL-SCNC: 99 MMOL/L (ref 98–107)
CO2 SERPL-SCNC: 30 MMOL/L (ref 21–32)
CREAT SERPL-MCNC: 0.8 MG/DL (ref 0.6–1.3)
EOSINOPHIL # BLD AUTO: 0.5 K/UL (ref 0–0.4)
EOSINOPHIL NFR BLD AUTO: 4.6 % (ref 0–4)
ERYTHROCYTE [DISTWIDTH] IN BLOOD BY AUTOMATED COUNT: 14.2 % (ref 11.6–13.7)
GFR SERPL CREATININE-BSD FRML MDRD: 141 ML/MIN (ref 90–?)
GLUCOSE SERPL-MCNC: 89 MG/DL (ref 74–106)
HCT VFR BLD AUTO: 45.6 % (ref 36–52)
HGB BLD-MCNC: 15.2 G/DL (ref 12–18)
LYMPHOCYTES # BLD AUTO: 1.7 K/UL (ref 2–11.5)
LYMPHOCYTES NFR BLD AUTO: 16.7 % (ref 20.5–51.1)
MCH RBC QN AUTO: 28 PG (ref 27–31)
MCHC RBC AUTO-ENTMCNC: 33 G/DL (ref 33–37)
MCV RBC AUTO: 82.6 FL (ref 80–94)
MONOCYTES # BLD AUTO: 0.7 K/UL (ref 0.8–1)
MONOCYTES NFR BLD AUTO: 6.8 % (ref 1.7–9.3)
NEUTROPHILS # BLD AUTO: 7.4 K/UL (ref 1.8–7.7)
NEUTROPHILS NFR BLD AUTO: 71.3 % (ref 42.2–75.2)
PLATELET # BLD AUTO: 230 K/UL (ref 140–450)
POTASSIUM SERPL-SCNC: 3.7 MMOL/L (ref 3.5–5.1)
RBC # BLD AUTO: 5.53 MIL/UL (ref 4.2–6.1)
SODIUM SERPL-SCNC: 137 MMOL/L (ref 136–145)
WBC # BLD AUTO: 10.4 K/UL (ref 4.8–10.8)

## 2020-03-07 NOTE — NUR
34/M BIB by car to ER c/o Left hand pain and tingling, numbness. Pt states 
numbness radiating all the way down to LLE and RUE. Patient is only taking 
blood pressure medications at home. Denies PmHx. Patient is at bed resting, bed 
rails up x 2, HOB elevated.

## 2025-04-01 NOTE — NUR
33 Y/O M C/C OF FEVER X1 DAY; RLE REDNESS. PER PT UNKNOWN CAUSE OF REDNESS ON 
RLE. PAIN 10/10, SHARP SENSATION. PT NKA. NO HX. NO RX. DENIES DIARRHEA; 
VOMITING ONE EPISODE. SIDE RAIL X1. FAMILY AT BEDSIDE. Patient with Chronic debility due to age-related physical debility. The patient's latest AMPAC (Activity Measure for Post Acute Care) Score is listed below.    AM-PAC Score - How much help does the patient need for each activity listed  Basic Mobility Total Score: 10  Turning over in bed (including adjusting bedclothes, sheets and blankets)?: A lot  Sitting down on and standing up from a chair with arms (e.g., wheelchair, bedside commode, etc.): A lot  Moving from lying on back to sitting on the side of the bed?: A lot  Moving to and from a bed to a chair (including a wheelchair)?: A lot  Need to walk in hospital room?: Unable  Climbing 3-5 steps with a railing?: Unable    Plan  - PT/OT consulted  - Fall precautions in place